# Patient Record
Sex: FEMALE | Race: WHITE
[De-identification: names, ages, dates, MRNs, and addresses within clinical notes are randomized per-mention and may not be internally consistent; named-entity substitution may affect disease eponyms.]

---

## 2017-04-02 ENCOUNTER — HOSPITAL ENCOUNTER (EMERGENCY)
Dept: HOSPITAL 33 - ED | Age: 52
Discharge: HOME | End: 2017-04-02
Payer: OTHER GOVERNMENT

## 2017-04-02 VITALS — HEART RATE: 80 BPM

## 2017-04-02 VITALS — SYSTOLIC BLOOD PRESSURE: 149 MMHG | DIASTOLIC BLOOD PRESSURE: 99 MMHG | OXYGEN SATURATION: 99 %

## 2017-04-02 DIAGNOSIS — E11.9: ICD-10-CM

## 2017-04-02 DIAGNOSIS — E78.00: ICD-10-CM

## 2017-04-02 DIAGNOSIS — S23.41XA: Primary | ICD-10-CM
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
## 2017-04-02 PROCEDURE — 96372 THER/PROPH/DIAG INJ SC/IM: CPT

## 2017-04-02 PROCEDURE — 71100 X-RAY EXAM RIBS UNI 2 VIEWS: CPT

## 2017-04-02 PROCEDURE — 99284 EMERGENCY DEPT VISIT MOD MDM: CPT

## 2017-04-02 NOTE — ERPHSYRPT
- History of Present Illness


Time Seen by Provider: 04/02/17 01:20


Source: patient, other (N.N.)


Exam Limitations: no limitations


Patient Subjective Stated Complaint: STATES THAT SHE AND HER  WERE 

HORSEPLAYING AROUND AND SHE HURT HER RIGHT LOWER RIBS - SHE STATES THAT THE 

PAIN IS GETTING WORSE AND SHE CANT TAKE A DEEP BREATH NOW


Triage Nursing Assessment: AMBULATORY TO TREATMENT AREA - STEADY GAIT - MOVES 

ALL EXTREMITIES WITH EQUAL STRENGTH.  ALERT/ORIENTED - GRIMMACING AFFECT.  SKIN 

FLUSHED/DRY - NO RASH - BRUISING UNDER THE RIGHT BREAST NOTED.  RESPS SHALLOW 

PER PAIN


Physician History: 





REPORTEDLY PT AND HER  WERE HORSE-PLAYING TONIGHT AND SHE HURT HER RIGHT 

LOWER RIBS. PT ALSO STATES SHE HAS BEEN LIFTING HOUSEHOLD ITEMS HELPING HER 

DAUGHTER MOVE IN THE PAST 3 DAYS. PT DENIES SHORTNESS OF AIR, ABDOMINAL PAIN, 

VOMITING.


Allergies/Adverse Reactions: 








No Known Drug Allergies Allergy (Unverified 04/02/17 01:16)


 





Hx Tetanus, Diphtheria Vaccination/Date Given: No


Hx Influenza Vaccination/Date Given: No


Hx Pneumococcal Vaccination/Date Given: No


Immunizations Up to Date: Yes





- Review of Systems


Respiratory: No Dyspnea


Cardiac: Other (RIGHT LOWER RIB PAIN)


Abdominal/Gastrointestinal: No Abdominal Pain, No Vomiting


Neurological: No Headache


Endocrine: No Excessive Sweating


All Other Systems: Reviewed and Negative





- Past Medical History


Pertinent Past Medical History: Yes


Cardiac History: High Cholesterol


Endocrine Medical History: Diabetes Type II





- Past Surgical History


Past Surgical History: Yes


Other Surgical History: CATARACTS





- Social History


Smoking Status: Current every day smoker


Exposure to second hand smoke: No


Drug Use: marijuana


Patient Lives Alone: No





- Female History


Hx Last Menstrual Period: N/A





- Nursing Vital Signs


Nursing Vital Signs: 


 Initial Vital Signs











Pulse Rate                     110


 


Respiratory Rate               18


 


Blood Pressure [Left Arm]      149/99


 


Pain Intensity                 10

















- Physical Exam


General Appearance: mild distress, alert


Eye Exam: PERRL/EOMI


Ears, Nose, Throat Exam: pharynx normal


Neck Exam: normal inspection


Respiratory Exam: chest tenderness (MILD RIGHT LOWER ANTERIOR RIB TENDERNESS 

WITHOUT CREPITUS)


Cardiovascular Exam: normal heart sounds


Gastrointestinal/Abdomen Exam: soft, normal bowel sounds


Back Exam: normal inspection


Extremity Exam: pedal edema (+1 PEDAL EDEMA BILATERALLY)


Neurologic Exam: alert, cooperative


Skin Exam: warm, dry


**SpO2 Interpretation**: normal


SpO2: 99


Oxygen Delivery: Room Air





- Course


Nursing assessment & vital signs reviewed: Yes





- Radiology Exams


  ** Right Ribs


X-ray Interpretation: Teleradiologist Report (NORMAL RIGHT RIB X-RAYS.)


Ordered Tests: 


 Active Orders 24 hr











 Category Date Time Status


 


 RIBS UNILATERAL Stat Exams  04/02/17 01:32 Taken








Medication Summary














Discontinued Medications














Generic Name Dose Route Start Last Admin





  Trade Name Freq  PRN Reason Stop Dose Admin


 


Hydromorphone HCl  1 mg  04/02/17 01:33  04/02/17 01:42





  Hydromorphone 1 Mg/Ml Ampule***  IM  04/02/17 01:34  1 mg





  STAT ONE   Administration


 


Hydromorphone HCl  Confirm  04/02/17 01:36  





  Hydromorphone 1 Mg/Ml Ampule***  Administered  04/02/17 01:37  





  Dose   





  1 mg   





  .ROUTE   





  .STK-MED ONE   


 


Promethazine HCl  25 mg  04/02/17 01:33  04/02/17 01:41





  Phenergan 25 Mg Inj***  IM  04/02/17 01:34  25 mg





  STAT ONE   Administration


 


Promethazine HCl  Confirm  04/02/17 01:36  





  Phenergan 25 Mg Inj***  Administered  04/02/17 01:37  





  Dose   





  25 mg   





  .ROUTE   





  .STK-MED ONE   














- Departure


Time of Disposition: 02:24


Departure Disposition: Home


Clinical Impression: 


 RIGHT RIB STRAIN





Condition: Fair


Critical Care Time: No


Instructions:  Rib Contusion


Additional Instructions: 


FOLLOW UP WITH PRIVATE DOCTOR TOMORROW.


DO NOT LIFT, BEND OR TWIST TORSO.


Prescriptions: 


Naproxen [Naprosyn] 500 mg PO [id number] PRN #20 tablet


 PRN Reason: Pain


Cyclobenzaprine HCl [Flexeril] 10 mg PO TID #0 tablet

## 2017-04-02 NOTE — XRAY
Indication: Anterior chest wall pain.



Comparison: None



2 views of the right ribs obtained.  No bony, articular, or soft tissue

abnormalities.



Comment: Preliminary interpretation was made by [facility].  No discrepancy.

## 2019-01-13 ENCOUNTER — HOSPITAL ENCOUNTER (EMERGENCY)
Dept: HOSPITAL 33 - ED | Age: 54
Discharge: HOME | End: 2019-01-13
Payer: OTHER GOVERNMENT

## 2019-01-13 VITALS — SYSTOLIC BLOOD PRESSURE: 145 MMHG | OXYGEN SATURATION: 98 % | HEART RATE: 78 BPM | DIASTOLIC BLOOD PRESSURE: 84 MMHG

## 2019-01-13 DIAGNOSIS — E11.9: ICD-10-CM

## 2019-01-13 DIAGNOSIS — Z79.899: ICD-10-CM

## 2019-01-13 DIAGNOSIS — Z79.4: ICD-10-CM

## 2019-01-13 DIAGNOSIS — S49.91XA: Primary | ICD-10-CM

## 2019-01-13 DIAGNOSIS — E78.00: ICD-10-CM

## 2019-01-13 DIAGNOSIS — M25.511: ICD-10-CM

## 2019-01-13 PROCEDURE — 99284 EMERGENCY DEPT VISIT MOD MDM: CPT

## 2019-01-13 PROCEDURE — 73030 X-RAY EXAM OF SHOULDER: CPT

## 2019-01-13 PROCEDURE — 96372 THER/PROPH/DIAG INJ SC/IM: CPT

## 2019-01-13 NOTE — ERPHSYRPT
- History of Present Illness


Time Seen by Provider: 01/13/19 13:01


Source: patient


Exam Limitations: no limitations


Patient Subjective Stated Complaint: pt here for numbness and pain to right 

upper arm for over a month now, after working outside, was put on maloxicam by 

 with no relief


Triage Nursing Assessment: pt walked in,alert, resp easy, skin w/d/p. no 

swelling to arm, tender to touch, strong radial pulse


Physician History: 





The patient is a right-handed 53-year-old female who complains of right 

shoulder and upper arm pain for about one month.  It all began a few minutes 

after she tossed a garbage sack full of yard waste off to the right hand side 

of her using her right arm.  Ever since that event, she has been experiencing 

pain when she moves her arm out to her side or up towards her head.  It is 

difficult for her to sleep with her right arm curled underneath her head 

because of the pain.  She denies numbness or tingling.  She saw her local 

family doctor and was given meloxicam without relief.  





Her past medical history is significant for DM and high cholesterol.


Occurred: other (1 month)


Method of Injury: twisted


Quality: constant


Severity of Pain-Max: moderate


Severity of Pain-Current: moderate


Extremities Pain Location: shoulder: right


Modifying Factors: Improves With: nothing


Associated Symptoms: none


Allergies/Adverse Reactions: 








No Known Drug Allergies Allergy (Verified 01/13/19 12:51)


 





Home Medications: 








Insulin Glargine** [Lantus Insulin**] 33 unit SQ DAILY 01/13/19 [History]


Meloxicam 7.5 mg DAILY 01/13/19 [History]


Rosuvastatin Calcium [Crestor] 10 mg DAILY 01/13/19 [History]





Hx Tetanus, Diphtheria Vaccination/Date Given: No


Hx Influenza Vaccination/Date Given: No


Hx Pneumococcal Vaccination/Date Given: No


Immunizations Up to Date: Yes





- Review of Systems


Constitutional: No Fever, No Chills


Eyes: No Symptoms


Ears, Nose, & Throat: No Symptoms


Respiratory: No Cough, No Dyspnea


Cardiac: No Chest Pain, No Edema, No Syncope


Abdominal/Gastrointestinal: No Abdominal Pain, No Nausea, No Vomiting, No 

Diarrhea


Genitourinary Symptoms: No Dysuria


Musculoskeletal: Joint Pain


Skin: No Rash


Neurological: No Dizziness, No Focal Weakness, No Sensory Changes


Psychological: No Symptoms


Endocrine: No Symptoms


Hematologic/Lymphatic: No Symptoms


Immunological/Allergic: No Symptoms


All Other Systems: Reviewed and Negative





- Past Medical History


Pertinent Past Medical History: Yes


Cardiac History: High Cholesterol


Endocrine Medical History: Diabetes Type II





- Past Surgical History


Past Surgical History: Yes


Gastrointestinal: Cholecystectomy


Female Surgical History: Tubal Ligation


Other Surgical History: CATARACTS





- Social History


Smoking Status: Current every day smoker


Exposure to second hand smoke: Yes


Drug Use: none


Patient Lives Alone: No





- Female History


Hx Last Menstrual Period: post


Hx Pregnant Now: No





- Nursing Vital Signs


Nursing Vital Signs: 


 Pain Scale











Pain Intensity                 8

















- Physical Exam


General Appearance: alert


Eyes, Ears, Nose, Throat Exam: moist mucous membranes


Neck Exam: non-tender, supple


Cardiovascular/Respiratory Exam: chest non-tender, normal breath sounds, 

regular rate/rhythm, no respiratory distress


Abdominal Exam: non-tender, No guarding


Back Exam: normal inspection, No vertebral tenderness


Shoulder Exam: limited ROM (Examination of the right shoulder and right upper 

arm: The patient has pain on extension of her right arm out to her side and up 

near her head.  She has pain on resisted flexion and extension as well.  The 

pain is more in her shoulder and proximal upper arm.)


Elbow/Forearm Exam: normal inspection


Wrist Exam: normal inspection


Hand Exam: normal inspection


Neuro/Tendon Exam: normal sensation, normal motor functions


Mental Status Exam: alert, oriented x 3, cooperative


Skin Exam: normal color, warm, dry


**SpO2 Interpretation**: normal


Oxygen Delivery: Room Air





- Radiology Exams


  ** Right Shoulder


X-ray Interpretation: Interpreted by me, Negative, No Fracture, No Subluxation


Ordered Tests: 


 Active Orders 24 hr











 Category Date Time Status


 


 SHOULDER Stat Exams  01/13/19 13:15 Ordered








Medication Summary














Discontinued Medications














Generic Name Dose Route Start Last Admin





  Trade Name Rhonda  PRN Reason Stop Dose Admin


 


Ketorolac Tromethamine  60 mg  01/13/19 13:15  01/13/19 13:40





  Toradol 30 Mg Injection***  IM  01/13/19 13:16  60 mg





  STAT ONE   Administration





     





     





     





     


 


Ketorolac Tromethamine  Confirm  01/13/19 13:26  





  Toradol 30 Mg Injection***  Administered  01/13/19 13:27  





  Dose   





  60 mg   





  .ROUTE   





  .STK-MED ONE   





     





     





     





     














- Progress


Progress: improved


Counseled pt/family regarding: diagnosis, need for follow-up, rad results





- Departure


Time of Disposition: 13:52


Departure Disposition: Home


Clinical Impression: 


 Right shoulder injury





Condition: Stable


Critical Care Time: No


Referrals: 


RASHIDA PEREZ [Primary Care Provider] - 


Additional Instructions: 


You have a right shoulder injury that may be due to a rotator cuff injury.  The 

x-ray of your right shoulder did not show any bone problems.  You were given 

Toradol 60 mg by IM in the ER.  You may continue with the meloxicam as 

previously directed.  Take Tylenol No. 3 one tablet every 4-6 hours as needed.  

Follow-up with your primary medical doctor tomorrow.  You may possibly need an 

MRI of your shoulder and evaluation by orthopedic surgeon.


Prescriptions: 


Codeine Phosphate/APAP #3** [Tylenol #3 Tablet**] 1 tab PO Q4-6HPRN PRN #10 

tablet MDD 6


 PRN Reason: Moderate Pain

## 2019-01-13 NOTE — XRAY
Indication: Pain 1 month.  No known injury.



Comparison: None



3 views of the right shoulder demonstrates mild AC degenerative arthropathy.

No other bony, articular, or soft tissue abnormalities.